# Patient Record
Sex: FEMALE | Race: WHITE | NOT HISPANIC OR LATINO | Employment: OTHER | URBAN - METROPOLITAN AREA
[De-identification: names, ages, dates, MRNs, and addresses within clinical notes are randomized per-mention and may not be internally consistent; named-entity substitution may affect disease eponyms.]

---

## 2023-10-08 ENCOUNTER — APPOINTMENT (EMERGENCY)
Dept: CT IMAGING | Facility: HOSPITAL | Age: 73
End: 2023-10-08

## 2023-10-08 ENCOUNTER — HOSPITAL ENCOUNTER (EMERGENCY)
Facility: HOSPITAL | Age: 73
Discharge: HOME | End: 2023-10-08
Attending: EMERGENCY MEDICINE | Admitting: EMERGENCY MEDICINE

## 2023-10-08 VITALS
RESPIRATION RATE: 15 BRPM | SYSTOLIC BLOOD PRESSURE: 137 MMHG | WEIGHT: 149.91 LBS | OXYGEN SATURATION: 97 % | TEMPERATURE: 98.2 F | HEART RATE: 69 BPM | DIASTOLIC BLOOD PRESSURE: 65 MMHG

## 2023-10-08 DIAGNOSIS — R51.9 HEADACHE: Primary | ICD-10-CM

## 2023-10-08 DIAGNOSIS — R11.2 NAUSEA AND VOMITING: ICD-10-CM

## 2023-10-08 DIAGNOSIS — Z75.8 DOES NOT HAVE PRIMARY CARE PROVIDER: ICD-10-CM

## 2023-10-08 DIAGNOSIS — M54.2 NECK PAIN: ICD-10-CM

## 2023-10-08 LAB
ALBUMIN SERPL BCP-MCNC: 4.3 G/DL (ref 3.5–5)
ALP SERPL-CCNC: 81 U/L (ref 34–104)
ALT SERPL W P-5'-P-CCNC: 10 U/L (ref 7–52)
ANION GAP SERPL CALCULATED.3IONS-SCNC: 9 MMOL/L
AST SERPL W P-5'-P-CCNC: 13 U/L (ref 13–39)
BASOPHILS # BLD AUTO: 0.03 THOUSANDS/ÂΜL (ref 0–0.1)
BASOPHILS NFR BLD AUTO: 1 % (ref 0–1)
BILIRUB SERPL-MCNC: 0.79 MG/DL (ref 0.2–1)
BUN SERPL-MCNC: 14 MG/DL (ref 5–25)
CALCIUM SERPL-MCNC: 9.1 MG/DL (ref 8.4–10.2)
CHLORIDE SERPL-SCNC: 105 MMOL/L (ref 96–108)
CO2 SERPL-SCNC: 26 MMOL/L (ref 21–32)
CREAT SERPL-MCNC: 0.53 MG/DL (ref 0.6–1.3)
EOSINOPHIL # BLD AUTO: 0.06 THOUSAND/ÂΜL (ref 0–0.61)
EOSINOPHIL NFR BLD AUTO: 1 % (ref 0–6)
ERYTHROCYTE [DISTWIDTH] IN BLOOD BY AUTOMATED COUNT: 13.1 % (ref 11.6–15.1)
GFR SERPL CREATININE-BSD FRML MDRD: 95 ML/MIN/1.73SQ M
GLUCOSE SERPL-MCNC: 102 MG/DL (ref 65–140)
HCT VFR BLD AUTO: 39.3 % (ref 34.8–46.1)
HGB BLD-MCNC: 13.6 G/DL (ref 11.5–15.4)
IMM GRANULOCYTES # BLD AUTO: 0.01 THOUSAND/UL (ref 0–0.2)
IMM GRANULOCYTES NFR BLD AUTO: 0 % (ref 0–2)
LYMPHOCYTES # BLD AUTO: 2.62 THOUSANDS/ÂΜL (ref 0.6–4.47)
LYMPHOCYTES NFR BLD AUTO: 42 % (ref 14–44)
MCH RBC QN AUTO: 30.3 PG (ref 26.8–34.3)
MCHC RBC AUTO-ENTMCNC: 34.6 G/DL (ref 31.4–37.4)
MCV RBC AUTO: 88 FL (ref 82–98)
MONOCYTES # BLD AUTO: 0.45 THOUSAND/ÂΜL (ref 0.17–1.22)
MONOCYTES NFR BLD AUTO: 7 % (ref 4–12)
NEUTROPHILS # BLD AUTO: 3.05 THOUSANDS/ÂΜL (ref 1.85–7.62)
NEUTS SEG NFR BLD AUTO: 49 % (ref 43–75)
NRBC BLD AUTO-RTO: 0 /100 WBCS
PLATELET # BLD AUTO: 297 THOUSANDS/UL (ref 149–390)
PMV BLD AUTO: 8.9 FL (ref 8.9–12.7)
POTASSIUM SERPL-SCNC: 3.6 MMOL/L (ref 3.5–5.3)
PROT SERPL-MCNC: 7.2 G/DL (ref 6.4–8.4)
RBC # BLD AUTO: 4.49 MILLION/UL (ref 3.81–5.12)
SODIUM SERPL-SCNC: 140 MMOL/L (ref 135–147)
WBC # BLD AUTO: 6.22 THOUSAND/UL (ref 4.31–10.16)

## 2023-10-08 PROCEDURE — 96374 THER/PROPH/DIAG INJ IV PUSH: CPT

## 2023-10-08 PROCEDURE — 70496 CT ANGIOGRAPHY HEAD: CPT

## 2023-10-08 PROCEDURE — 99283 EMERGENCY DEPT VISIT LOW MDM: CPT

## 2023-10-08 PROCEDURE — G1004 CDSM NDSC: HCPCS

## 2023-10-08 PROCEDURE — 99284 EMERGENCY DEPT VISIT MOD MDM: CPT | Performed by: EMERGENCY MEDICINE

## 2023-10-08 PROCEDURE — 70498 CT ANGIOGRAPHY NECK: CPT

## 2023-10-08 PROCEDURE — 85025 COMPLETE CBC W/AUTO DIFF WBC: CPT | Performed by: EMERGENCY MEDICINE

## 2023-10-08 PROCEDURE — 80053 COMPREHEN METABOLIC PANEL: CPT | Performed by: EMERGENCY MEDICINE

## 2023-10-08 PROCEDURE — 36415 COLL VENOUS BLD VENIPUNCTURE: CPT | Performed by: EMERGENCY MEDICINE

## 2023-10-08 RX ORDER — METOCLOPRAMIDE HYDROCHLORIDE 5 MG/ML
10 INJECTION INTRAMUSCULAR; INTRAVENOUS ONCE
Status: COMPLETED | OUTPATIENT
Start: 2023-10-08 | End: 2023-10-08

## 2023-10-08 RX ORDER — DIAZEPAM 5 MG/1
5 TABLET ORAL ONCE
Status: COMPLETED | OUTPATIENT
Start: 2023-10-08 | End: 2023-10-08

## 2023-10-08 RX ORDER — ACETAMINOPHEN 325 MG/1
975 TABLET ORAL ONCE
Status: COMPLETED | OUTPATIENT
Start: 2023-10-08 | End: 2023-10-08

## 2023-10-08 RX ORDER — METOCLOPRAMIDE 10 MG/1
10 TABLET ORAL EVERY 6 HOURS
Qty: 30 TABLET | Refills: 0 | Status: SHIPPED | OUTPATIENT
Start: 2023-10-08 | End: 2023-10-16 | Stop reason: ALTCHOICE

## 2023-10-08 RX ADMIN — METOCLOPRAMIDE 10 MG: 5 INJECTION, SOLUTION INTRAMUSCULAR; INTRAVENOUS at 19:56

## 2023-10-08 RX ADMIN — IOHEXOL 100 ML: 350 INJECTION, SOLUTION INTRAVENOUS at 21:14

## 2023-10-08 RX ADMIN — DIAZEPAM 5 MG: 5 TABLET ORAL at 19:46

## 2023-10-08 RX ADMIN — ACETAMINOPHEN 975 MG: 325 TABLET, FILM COATED ORAL at 19:46

## 2023-10-08 NOTE — ED PROVIDER NOTES
History  Chief Complaint   Patient presents with   • Headache     Pt c/o headache and pain in her neck for a about  week. Pt indicated that she has been vomiting from the pain. PT stated that her PCP told her it was from arthritis in her neck which is causing numbness in her arms. 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 The patient is primarily Japanese speaking, language interpretor service utilized for encounter (interpretor #[id number]). The patient states that over the last week she has been experiencing bilateral posterior neck pain with resulting posterior and generalized headache as well as episodes of nausea and vomiting secondary to pain. She notes that she has had similar episodes of neck pain, headache, and N/V off and on for many years which has been attributed to cervical arthritis. The patient is originally from [facility] and moved to the area several months ago and she is in the process of establishing care with local primary care and specialists. She notes occasional tingling in her bilateral arms when she experiences the neck pain and headache. She denies any fever, chills, cough, dyspnea, sore throat, chest pain, abdominal pain, diarrhea, urinary symptoms, back pain, visual changes, dizziness, syncope, numbness, or focal weakness. None       Past Medical History:   Diagnosis Date   • Disease of thyroid gland        Past Surgical History:   Procedure Laterality Date   • THYROIDECTOMY         No family history on file. I have reviewed and agree with the history as documented. E-Cigarette/Vaping   • E-Cigarette Use Never User      E-Cigarette/Vaping Substances     Social History     Tobacco Use   • Smoking status: Never   Vaping Use   • Vaping Use: Never used   Substance Use Topics   • Alcohol use: Never   • Drug use: Never       Review of Systems   Constitutional: Negative for chills and fever. HENT: Negative for congestion, rhinorrhea and sore throat. Respiratory: Negative for cough and shortness of breath. Cardiovascular: Negative for chest pain and palpitations. Gastrointestinal: Positive for nausea and vomiting. Negative for abdominal pain and diarrhea. Genitourinary: Negative for dysuria and hematuria. Musculoskeletal: Positive for neck pain. Negative for back pain. Neurological: Positive for headaches. Negative for dizziness, weakness, light-headedness and numbness. All other systems reviewed and are negative. Physical Exam  Physical Exam  Vitals and nursing note reviewed. Constitutional:       General: She is not in acute distress. Appearance: Normal appearance. She is normal weight. She is not ill-appearing. HENT:      Head: Normocephalic and atraumatic. Right Ear: External ear normal.      Left Ear: External ear normal.      Nose: Nose normal. No congestion or rhinorrhea. Mouth/Throat:      Mouth: Mucous membranes are moist.      Pharynx: Oropharynx is clear. No oropharyngeal exudate or posterior oropharyngeal erythema. Eyes:      Extraocular Movements: Extraocular movements intact. Conjunctiva/sclera: Conjunctivae normal.      Pupils: Pupils are equal, round, and reactive to light. Neck:      Comments: Patient reports bilateral upper cervical muscle tightness and pain with no focal tenderness on palpation, no midline tenderness or step-offs, normal range of motion. Cardiovascular:      Rate and Rhythm: Normal rate and regular rhythm. Pulses: Normal pulses. Heart sounds: Normal heart sounds. No murmur heard. Pulmonary:      Effort: Pulmonary effort is normal. No respiratory distress. Breath sounds: Normal breath sounds. No wheezing or rales. Abdominal:      General: Abdomen is flat. Bowel sounds are normal. There is no distension. Palpations: Abdomen is soft. Tenderness: There is no abdominal tenderness.  There is no right CVA tenderness, left CVA tenderness or guarding. Musculoskeletal:         General: No swelling or tenderness. Normal range of motion. Cervical back: Normal range of motion and neck supple. No tenderness. Skin:     General: Skin is warm and dry. Capillary Refill: Capillary refill takes less than 2 seconds. Neurological:      General: No focal deficit present. Mental Status: She is alert and oriented to person, place, and time. Cranial Nerves: No cranial nerve deficit. Sensory: No sensory deficit. Motor: No weakness. Comments: Awake, alert, oriented x3. No facial asymmetry. Radial nerves II-XII intact and symmetric. Strength and sensation grossly intact and symmetric in the bilateral upper and lower extremities. No focal deficits.          Vital Signs  ED Triage Vitals   Temperature Pulse Respirations Blood Pressure SpO2   10/08/23 1851 10/08/23 1851 10/08/23 1851 10/08/23 1851 10/08/23 1851   98.2 °F (36.8 °C) 92 20 (!) 192/91 98 %      Temp Source Heart Rate Source Patient Position - Orthostatic VS BP Location FiO2 (%)   10/08/23 1851 10/08/23 1851 10/08/23 1851 10/08/23 1851 --   Oral Monitor Sitting Left arm       Pain Score       10/08/23 1930       6           Vitals:    10/08/23 1851 10/08/23 2115   BP: (!) 192/91 159/72   Pulse: 92 76   Patient Position - Orthostatic VS: Sitting Lying         Visual Acuity      ED Medications  Medications   metoclopramide (REGLAN) injection 10 mg (10 mg Intravenous Given 10/8/23 1956)   acetaminophen (TYLENOL) tablet 975 mg (975 mg Oral Given 10/8/23 1946)   diazepam (VALIUM) tablet 5 mg (5 mg Oral Given 10/8/23 1946)   iohexol (OMNIPAQUE) 350 MG/ML injection (SINGLE-DOSE) 100 mL (100 mL Intravenous Given 10/8/23 2114)       Diagnostic Studies  Results Reviewed     Procedure Component Value Units Date/Time    Comprehensive metabolic panel [[id number]]  (Abnormal) Collected: 10/08/23 2000    Lab Status: Final result Specimen: Blood from Arm, Left Updated: 10/08/23 2021     Sodium 140 mmol/L      Potassium 3.6 mmol/L      Chloride 105 mmol/L      CO2 26 mmol/L      ANION GAP 9 mmol/L      BUN 14 mg/dL      Creatinine 0.53 mg/dL      Glucose 102 mg/dL      Calcium 9.1 mg/dL      AST 13 U/L      ALT 10 U/L      Alkaline Phosphatase 81 U/L      Total Protein 7.2 g/dL      Albumin 4.3 g/dL      Total Bilirubin 0.79 mg/dL      eGFR 95 ml/min/1.73sq m     Narrative:      National Kidney Disease Foundation guidelines for Chronic Kidney Disease (CKD):   •  Stage 1 with normal or high GFR (GFR > 90 mL/min/1.73 square meters)  •  Stage 2 Mild CKD (GFR = 60-89 mL/min/1.73 square meters)  •  Stage 3A Moderate CKD (GFR = 45-59 mL/min/1.73 square meters)  •  Stage 3B Moderate CKD (GFR = 30-44 mL/min/1.73 square meters)  •  Stage 4 Severe CKD (GFR = 15-29 mL/min/1.73 square meters)  •  Stage 5 End Stage CKD (GFR <15 mL/min/1.73 square meters)  Note: GFR calculation is accurate only with a steady state creatinine    CBC and differential [[id number]] Collected: 10/08/23 2000    Lab Status: Final result Specimen: Blood from Arm, Left Updated: 10/08/23 2007     WBC 6.22 Thousand/uL      RBC 4.49 Million/uL      Hemoglobin 13.6 g/dL      Hematocrit 39.3 %      MCV 88 fL      MCH 30.3 pg      MCHC 34.6 g/dL      RDW 13.1 %      MPV 8.9 fL      Platelets 352 Thousands/uL      nRBC 0 /100 WBCs      Neutrophils Relative 49 %      Immat GRANS % 0 %      Lymphocytes Relative 42 %      Monocytes Relative 7 %      Eosinophils Relative 1 %      Basophils Relative 1 %      Neutrophils Absolute 3.05 Thousands/µL      Immature Grans Absolute 0.01 Thousand/uL      Lymphocytes Absolute 2.62 Thousands/µL      Monocytes Absolute 0.45 Thousand/µL      Eosinophils Absolute 0.06 Thousand/µL      Basophils Absolute 0.03 Thousands/µL                  CTA head and neck with and without contrast   Final Result by Yuniel Rivera DO (10/08 2142)      No acute intracranial abnormality No large vessel occlusion, aneurysm, or dissection            Workstation performed: [id number]                    Procedures  Procedures         ED Course  ED Course as of 10/08/23 2221   Sun Oct 08, 2023   2026 WBC: 6.22   2026 Hemoglobin: 13.6   2026 Platelet Count: 268   2026 Sodium: 140   2026 Potassium: 3.6   2026 Chloride: 105   2026 CO2: 26   2026 Anion Gap: 9   2026 BUN: 14   2026 Creatinine(!): 0.53   2026 Glucose, Random: 102   2026 eGFR: 95   2026 Calcium: 9.1   2026 AST: 13   2026 ALT: 10   2026 Alkaline Phosphatase: 81   2150 CTA head and neck with and without contrast  No acute intracranial abnormality     No large vessel occlusion, aneurysm, or dissection                                             Medical Decision Making  71 y/o female with 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 The patient is primarily Faroese speaking, language interpretor service utilized for encounter (interpretor #[id number]). The patient states that over the last week she has been experiencing bilateral posterior neck pain with resulting posterior and generalized headache as well as episodes of nausea and vomiting secondary to pain. She notes that she has had similar episodes of neck pain, headache, and N/V off and on for many years which has been attributed to cervical arthritis. The patient is originally from [facility] and moved to the area several months ago and she is in the process of establishing care with local primary care and specialists. She notes occasional tingling in her bilateral arms when she experiences the neck pain and headache. She denies any fever, chills, cough, dyspnea, sore throat, chest pain, abdominal pain, diarrhea, urinary symptoms, back pain, visual changes, dizziness, syncope, numbness, or focal weakness. Vital signs reviewed.  Patient reports bilateral upper cervical muscle tightness and pain with no focal tenderness on palpation, no midline tenderness or step-offs, normal range of motion. Awake, alert, oriented x3. No facial asymmetry. Radial nerves II-XII intact and symmetric. Strength and sensation grossly intact and symmetric in the bilateral upper and lower extremities. No focal deficits. See physical exam documentation for full exam findings. Differential diagnosis includes but is not limited to migraine, cervical muscle spasm, cervicogenic headache, aneurysm, ICH, and/or cervical arthritis. Will obtain CBC, chemistry, and CTA head and neck with and without contrast.  Symptomatic treatment with Reglan, Tylenol, and Valium. Imaging with no acute intracranial abnormality. Symptoms improved after treatment. Provided patient with referral for primary care as well as comprehensive spine. I discussed all findings, treatment, red flags/return precautions, and outpatient follow-up and the patient/family understand and agree. Stable for discharge. Amount and/or Complexity of Data Reviewed  Labs: ordered. Decision-making details documented in ED Course. Radiology: ordered. Decision-making details documented in ED Course. Risk  OTC drugs. Prescription drug management. Disposition  Final diagnoses:   Headache   Neck pain   Nausea and vomiting   Does not have primary care provider     Time reflects when diagnosis was documented in both MDM as applicable and the Disposition within this note     Time User Action Codes Description Comment    10/8/2023 10:04 PM Johnna Cannon Add [R51.9] Headache     10/8/2023 10:04 PM Johnna Cannon Add [M54.2] Neck pain     10/8/2023 10:05 PM Johnna Cannon Add [R11.2] Nausea and vomiting     10/8/2023 10:06 PM Johnna Cannon Add [Z75.8] Does not have primary care provider       ED Disposition     ED Disposition   Discharge    Condition   Stable    Date/Time   Sun Oct 8, 2023 10:05 PM    Manjit discharge to home/self care.                Follow-up Information Follow up With Specialties Details Why Contact Info Additional [address] [facility] [place] [facility] Call  As needed primary care needs to establish care [address] [facility] [phone]  [address] [facility] [place], [address]  GREGORY [address], [facility] [place], [place], [address] [facility] Department Emergency Medicine Go to  If symptoms worsen [address] [place] [address] [id number] [address] [facility] Emergency Department, [address] [place] [address] Dr, [address] [facility] Physical Therapy Call  For neck and spine specialist follow-up [phone]          Patient's Medications   Discharge Prescriptions    METOCLOPRAMIDE (REGLAN) 10 MG TABLET    Take 1 tablet (10 mg total) by mouth every 6 (six) hours       Start Date: 10/8/2023 End Date: --       Order Dose: 10 mg       Quantity: 30 tablet    Refills: 0           PDMP Review     None          ED Provider  Electronically Signed by           John Mclaughlin MD  10/08/23 7764

## 2023-10-09 ENCOUNTER — TELEPHONE (OUTPATIENT)
Dept: PHYSICAL THERAPY | Facility: OTHER | Age: 73
End: 2023-10-09

## 2023-10-09 NOTE — TELEPHONE ENCOUNTER
Patient's son in law called into [facility] today . I explained [facility] and made him aware I need to speak with the patient directly or if she gives the ok he  Or her daughter could be on speaker to help with translation. (Patient speaks Greek). NO INSURANCE, explained there is a self-pay option but PT site discuss prices with the patient. They are not sure and will discuss with the patient further and will call back. Will close referral per protocol. And will triage when patient calls back.

## 2023-10-16 ENCOUNTER — OFFICE VISIT (OUTPATIENT)
Dept: INTERNAL MEDICINE CLINIC | Facility: CLINIC | Age: 73
End: 2023-10-16

## 2023-10-16 VITALS
OXYGEN SATURATION: 95 % | SYSTOLIC BLOOD PRESSURE: 154 MMHG | TEMPERATURE: 99.4 F | DIASTOLIC BLOOD PRESSURE: 100 MMHG | BODY MASS INDEX: 26.8 KG/M2 | HEIGHT: 64 IN | HEART RATE: 96 BPM | RESPIRATION RATE: 16 BRPM | WEIGHT: 157 LBS

## 2023-10-16 DIAGNOSIS — R03.0 ELEVATED BLOOD PRESSURE READING: ICD-10-CM

## 2023-10-16 DIAGNOSIS — R51.9 HEADACHE: ICD-10-CM

## 2023-10-16 DIAGNOSIS — M54.2 NECK PAIN: Primary | ICD-10-CM

## 2023-10-16 RX ORDER — MELOXICAM 7.5 MG/1
7.5 TABLET ORAL DAILY
Qty: 10 TABLET | Refills: 0 | Status: SHIPPED | OUTPATIENT
Start: 2023-10-16

## 2023-10-16 RX ORDER — ACETAMINOPHEN 325 MG/1
650 TABLET ORAL EVERY 6 HOURS PRN
COMMUNITY

## 2023-10-16 NOTE — ASSESSMENT & PLAN NOTE
Localized to the occipital area, occurs along with the neck pain. No photophobia or visual disturbances. As above for neck pain.

## 2023-10-16 NOTE — ASSESSMENT & PLAN NOTE
-Patient blood pressure in the clinic is elevated at 154/100.  -Repeat manual blood pressure was 142/82  -Patient states her blood pressure is generally low to normal. -But family denies having any blood pressure machine at home. Patient advised to purchase blood pressure machine and monitor her blood pressure daily for a week and call the office with the readings. Patient and family member are in agreement with the plan.

## 2023-10-16 NOTE — PROGRESS NOTES
Name: Shira Bates      : 1950      MRN: [id number]  Encounter Provider: César Mosqueda MD  Encounter Date: 10/16/2023   Encounter department: [address] [place] [address]     1. Neck pain  Assessment & Plan:  -Evaluation of neck pain that has been ongoing for 7 years.  -Denies any injury to the neck or accidents in the past.  -Pain is aggravated by moving the head to the side and back. Relieved with Tylenol.  -Pain is localized in the neck posterior occipital area of the head, with intermittent numbness of the neck and left shoulder.  -Recently evaluated in the ED with CTA head and neck negative for any vascular lesions.  -Arthritis of the cervical spine, cervical stenosis or stiffness of the neck muscles are possible differentials. Plan:  X-ray of the cervical spine  Mobic daily for 10 days  Voltaren gel to be applied over the neck every 4 hours as needed for pain    Orders:  -     Ambulatory Referral to Internal Medicine  -     XR spine cervical complete 4 or 5 vw non injury; Future; Expected date: 10/16/2023  -     Diclofenac Sodium (VOLTAREN) 1 %; Apply 2 g topically 4 (four) times a day  -     meloxicam (Mobic) 7.5 mg tablet; Take 1 tablet (7.5 mg total) by mouth daily    2. Headache  Assessment & Plan:  Localized to the occipital area, occurs along with the neck pain. No photophobia or visual disturbances. As above for neck pain. Orders:  -     Ambulatory Referral to Internal Medicine  -     meloxicam (Mobic) 7.5 mg tablet; Take 1 tablet (7.5 mg total) by mouth daily    3. Elevated blood pressure reading  Assessment & Plan:  -Patient blood pressure in the clinic is elevated at 154/100.  -Repeat manual blood pressure was 142/82  -Patient states her blood pressure is generally low to normal. -But family denies having any blood pressure machine at home.     Patient advised to purchase blood pressure machine and monitor her blood pressure daily for a week and call the office with the readings. Patient and family member are in agreement with the plan. Depression Screening and Follow-up Plan: Patient was screened for depression during today's encounter. They screened negative with a PHQ-2 score of 0. Subjective      Ms. Nita Grande is a 70-year-old female primarily Mongolian speaking with no notable past medical history other than neck pain with associated head ache for the past 7 years. Patient is any previous trauma or injury to the head or neck. She recently immigrated from [facility] in July, does endorse getting evaluated in [facility] where she was told her pain is likely associated with rheumatoid arthritis and was advised to take Tylenol as needed. Her neck pain is aggravated by movement of the head to the side and back and is improved with Tylenol which she is taking 2-3 times a day for. She presents to the clinic for further evaluation as she has been needing to take Tylenol regularly to find relief of her pain. She endorses 1 or 2 episodes of associated nausea and vomiting. She was recently evaluated in the ED and work-up with CTA head and neck was negative. Patient denies any other symptoms at this time. History was obtained with the help of  services through [employer]. Review of Systems   Constitutional:  Negative for activity change, appetite change, chills and fever. HENT:  Negative for hearing loss. Respiratory:  Negative for cough, chest tightness and shortness of breath. Cardiovascular:  Negative for chest pain. Gastrointestinal:  Negative for abdominal pain, constipation and diarrhea. Genitourinary:  Negative for dysuria and urgency. Musculoskeletal:  Positive for neck pain. Negative for arthralgias and myalgias. Neurological:  Positive for headaches. Negative for dizziness and weakness.        Current Outpatient Medications on File Prior to Visit   Medication Sig    acetaminophen (TYLENOL) 325 mg tablet Take 650 mg by mouth every 6 (six) hours as needed for mild pain    [DISCONTINUED] metoclopramide (Reglan) 10 mg tablet Take 1 tablet (10 mg total) by mouth every 6 (six) hours (Patient not taking: Reported on 10/16/2023)       Objective     /100 (BP Location: Left arm, Patient Position: Sitting, Cuff Size: Adult)   Pulse 96   Temp 99.4 °F (37.4 °C) (Tympanic)   Resp 16   Ht 5' 4" (1.626 m)   Wt 71.2 kg (157 lb)   SpO2 95%   BMI 26.95 kg/m²     Physical Exam  Vitals reviewed. Constitutional:       General: She is not in acute distress. Appearance: Normal appearance. She is not ill-appearing. HENT:      Head: Normocephalic and atraumatic. Nose: Nose normal.      Mouth/Throat:      Mouth: Mucous membranes are moist.      Pharynx: Oropharynx is clear. Eyes:      Conjunctiva/sclera: Conjunctivae normal.   Cardiovascular:      Rate and Rhythm: Normal rate and regular rhythm. Pulmonary:      Effort: Pulmonary effort is normal. No respiratory distress. Breath sounds: No stridor. Abdominal:      General: Bowel sounds are normal. There is no distension. Palpations: Abdomen is soft. There is no mass. Tenderness: There is no abdominal tenderness. Musculoskeletal:      Cervical back: Tenderness (Superior part of the neck, and base of the skull on palpation) present. No rigidity. Right lower leg: No edema. Left lower leg: No edema. Skin:     General: Skin is warm. Coloration: Skin is not jaundiced. Neurological:      Mental Status: She is alert. Mental status is at baseline.        Percy Alexandra MD

## 2023-10-16 NOTE — ASSESSMENT & PLAN NOTE
-Evaluation of neck pain that has been ongoing for 7 years.  -Denies any injury to the neck or accidents in the past.  -Pain is aggravated by moving the head to the side and back. Relieved with Tylenol.  -Pain is localized in the neck posterior occipital area of the head, with intermittent numbness of the neck and left shoulder.  -Recently evaluated in the ED with CTA head and neck negative for any vascular lesions.  -Arthritis of the cervical spine, cervical stenosis or stiffness of the neck muscles are possible differentials.     Plan:  X-ray of the cervical spine  Mobic daily for 10 days  Voltaren gel to be applied over the neck every 4 hours as needed for pain

## 2023-10-16 NOTE — PATIENT INSTRUCTIONS
Check your blood pressure daily for one week and call our office with the readings. Apply Voltaren Gell to neck 4 times a day.

## 2023-10-20 ENCOUNTER — HOSPITAL ENCOUNTER (OUTPATIENT)
Dept: RADIOLOGY | Facility: HOSPITAL | Age: 73
End: 2023-10-20

## 2023-10-20 DIAGNOSIS — M54.2 NECK PAIN: ICD-10-CM

## 2023-10-20 PROCEDURE — 72050 X-RAY EXAM NECK SPINE 4/5VWS: CPT

## 2023-10-27 ENCOUNTER — OFFICE VISIT (OUTPATIENT)
Dept: URGENT CARE | Facility: MEDICAL CENTER | Age: 73
End: 2023-10-27

## 2023-10-27 VITALS
OXYGEN SATURATION: 96 % | DIASTOLIC BLOOD PRESSURE: 71 MMHG | WEIGHT: 160 LBS | SYSTOLIC BLOOD PRESSURE: 134 MMHG | HEART RATE: 94 BPM | TEMPERATURE: 97.4 F | RESPIRATION RATE: 18 BRPM | HEIGHT: 64 IN | BODY MASS INDEX: 27.31 KG/M2

## 2023-10-27 DIAGNOSIS — Z71.1 PHYSICALLY WELL BUT WORRIED: Primary | ICD-10-CM

## 2023-10-27 PROCEDURE — 99213 OFFICE O/P EST LOW 20 MIN: CPT

## 2023-10-27 NOTE — PATIENT INSTRUCTIONS
[facility] Dr. Ayad Everett or Dr. David Fields, your PCP who you were seen by on 10/16/2023. As previously instructed, monitor your blood pressure daily and call their office with the blood pressure readings. XR cervical spine has not been read by radiology yet. Please download [id number] to look for results or contact your PCP. Follow up with PCP in 3-5 days. If you develop any new or concerning symptoms such as the worst headache of your life, sudden severe headache, lightheaded or dizziness, visual disturbance, please proceed to the ER.

## 2023-10-27 NOTE — PROGRESS NOTES
[facility]        NAME: Leda Dong is a 72 y.o. female  : 1950    MRN: [id number]  DATE: 2023  TIME: 2:01 PM    Assessment and Plan   Physically well but worried [Z71.1]  1. Physically well but worried          Advised pt and family, that pt should not take medication that is not prescribed to her. Especially medication that potentially could affect her blood pressure that is being unmonitored. Pt's son verbalized understanding. Patient Instructions     Please call Jose Foley, Dr. Storm Welch or Dr. Aryan Santana, your PCP who you were seen by on 10/16/2023. As previously instructed, monitor your blood pressure daily and call their office with the blood pressure readings. XR cervical spine has not been read by radiology yet. Please download [id number] to look for results or contact your PCP. Follow up with PCP in 3-5 days. If you develop any new or concerning symptoms such as the worst headache of your life, sudden severe headache, lightheaded or dizziness, visual disturbance, please proceed to the ER. Chief Complaint     Chief Complaint   Patient presents with   • Blood Pressure Check     Pt. Reports that her BP has been intermittently high at home. She states that it has not been "that high". History of Present Illness       Pt is a 71 y/o F who presents to the clinic with a CC of ongoing but improving head and neck pain. [employer]  Services utilized for this visit. Family states they brought the pt to the clinic for medication for her blood pressure. Pt's son reports that she was seen in the ER and by internal medicine for her blood pressure. Upon chart review, family was instructed by her PCP that they should obtain an at home blood pressure cuff and check her blood pressure daily and call with the readings. Pt's son states they have not been taking her BP and have not call her PCP.  Pt son's stated that she took 2 blood pressure pills, stated the medication is called "Valstran". Medication is not prescribed to patient. Pt's son also states he would like to know what her XR results are for her neck which is the other reason he brought the patient to the clinic. Review of Systems   Review of Systems   Constitutional:  Negative for chills, diaphoresis and fever. Respiratory: Negative. Negative for cough, shortness of breath and wheezing. Cardiovascular: Negative. Negative for chest pain and palpitations. Skin: Negative. Negative for color change. Current Medications       Current Outpatient Medications:   •  acetaminophen (TYLENOL) 325 mg tablet, Take 650 mg by mouth every 6 (six) hours as needed for mild pain, Disp: , Rfl:   •  Diclofenac Sodium (VOLTAREN) 1 %, Apply 2 g topically 4 (four) times a day, Disp: 2 g, Rfl: 0  •  meloxicam (Mobic) 7.5 mg tablet, Take 1 tablet (7.5 mg total) by mouth daily, Disp: 10 tablet, Rfl: 0    Current Allergies     Allergies as of 10/27/2023   • (No Known Allergies)            The following portions of the patient's history were reviewed and updated as appropriate: allergies, current medications, past family history, past medical history, past social history, past surgical history and problem list.     Past Medical History:   Diagnosis Date   • Disease of thyroid gland        Past Surgical History:   Procedure Laterality Date   • THYROIDECTOMY         No family history on file. Medications have been verified. Objective   /71   Pulse 94   Temp (!) 97.4 °F (36.3 °C)   Resp 18   Ht 5' 4" (1.626 m)   Wt 72.6 kg (160 lb)   SpO2 96%   BMI 27.46 kg/m²        Physical Exam     Physical Exam  Vitals and nursing note reviewed. Constitutional:       General: She is not in acute distress. Appearance: Normal appearance. She is not ill-appearing, toxic-appearing or diaphoretic. HENT:      Head: Normocephalic.    Cardiovascular:      Rate and Rhythm: Normal rate and regular rhythm. Pulses: Normal pulses. Heart sounds: Normal heart sounds. No murmur heard. Pulmonary:      Effort: Pulmonary effort is normal. No respiratory distress. Breath sounds: Normal breath sounds. No stridor. No wheezing, rhonchi or rales. Chest:      Chest wall: No tenderness. Musculoskeletal:         General: Normal range of motion. Skin:     General: Skin is warm. Neurological:      Mental Status: She is alert.

## 2023-11-09 ENCOUNTER — OFFICE VISIT (OUTPATIENT)
Dept: INTERNAL MEDICINE CLINIC | Facility: CLINIC | Age: 73
End: 2023-11-09

## 2023-11-09 VITALS
HEART RATE: 84 BPM | WEIGHT: 163 LBS | DIASTOLIC BLOOD PRESSURE: 88 MMHG | SYSTOLIC BLOOD PRESSURE: 150 MMHG | TEMPERATURE: 99.1 F | OXYGEN SATURATION: 98 % | BODY MASS INDEX: 27.98 KG/M2 | RESPIRATION RATE: 16 BRPM

## 2023-11-09 DIAGNOSIS — M54.2 NECK PAIN: Primary | ICD-10-CM

## 2023-11-09 DIAGNOSIS — M47.812 SPONDYLOSIS OF CERVICAL REGION WITHOUT MYELOPATHY OR RADICULOPATHY: ICD-10-CM

## 2023-11-09 DIAGNOSIS — I10 HYPERTENSION, UNSPECIFIED TYPE: ICD-10-CM

## 2023-11-09 PROBLEM — R03.0 ELEVATED BLOOD PRESSURE READING: Status: RESOLVED | Noted: 2023-10-16 | Resolved: 2023-11-09

## 2023-11-09 PROCEDURE — 99213 OFFICE O/P EST LOW 20 MIN: CPT

## 2023-11-09 RX ORDER — SENNOSIDES 8.6 MG
650 CAPSULE ORAL EVERY 8 HOURS PRN
Qty: 30 TABLET | Refills: 0 | Status: SHIPPED | OUTPATIENT
Start: 2023-11-09

## 2023-11-09 RX ORDER — AMLODIPINE BESYLATE 5 MG/1
5 TABLET ORAL DAILY
Qty: 90 TABLET | Refills: 0 | Status: SHIPPED | OUTPATIENT
Start: 2023-11-09

## 2023-11-09 RX ORDER — CAPSAICIN 0.025 %
1 CREAM (GRAM) TOPICAL 2 TIMES DAILY
Qty: 25 G | Refills: 0 | Status: SHIPPED | OUTPATIENT
Start: 2023-11-09

## 2023-11-09 NOTE — ASSESSMENT & PLAN NOTE
Follow up for neck pain and associated occipital headache, found relief with the use of Mobic and Voltaren gel. X-ray of the spine shows some osteophytes posteriorly and mild arthritic changes. As patient's continues to have elevated blood pressure the pain reigmin was changed to Capsaicin cream BID and prn Tylenol arthritis 650 mg Q 8 hours. In addition patient is advised to use warm compresses and wear a scarf around the neck to keep warm when outdoors. Physical therapy referral was offered but patient's relative states there is already one in place and will like to focus on symptomatic treatment at this time.

## 2023-11-09 NOTE — ASSESSMENT & PLAN NOTE
Patient's BP remains elevated in systolic 970'B. Patient has not been checking her BP at home, she denies any chest pain, pressure, headaches other than those associated with her neck pain. Will start Amlodipine 5 mg due to repeat high blood pressure readings. Patient is advised to monitor and report for any signs of hypotension, lightheadedness or weakness. She is encouraged to check her blood pressure at home. She is agreeable to come in for a nurse visit for BP check in the clinic followed by a 3 month follow up.

## 2023-11-09 NOTE — PROGRESS NOTES
Name: Annel Jim      : 1950      MRN: [id number]  Encounter Provider: Juancarlos Redding MD  Encounter Date: 2023   Encounter department: [address] [place] [address]     1. Neck pain  Assessment & Plan:  Follow up for neck pain and associated occipital headache, found relief with the use of Mobic and Voltaren gel. X-ray of the spine shows some osteophytes posteriorly and mild arthritic changes. As patient's continues to have elevated blood pressure the pain reigmin was changed to Capsaicin cream BID and prn Tylenol arthritis 650 mg Q 8 hours. In addition patient is advised to use warm compresses and wear a scarf around the neck to keep warm when outdoors. Physical therapy referral was offered but patient's relative states there is already one in place and will like to focus on symptomatic treatment at this time. Orders:  -     acetaminophen (TYLENOL) 650 mg CR tablet; Take 1 tablet (650 mg total) by mouth every 8 (eight) hours as needed for mild pain  -     capsaicin (ZOSTRIX) 0.025 % cream; Apply 1 Application topically 2 (two) times a day    2. Hypertension, unspecified type  Assessment & Plan:  Patient's BP remains elevated in systolic 713'Y. Patient has not been checking her BP at home, she denies any chest pain, pressure, headaches other than those associated with her neck pain. Will start Amlodipine 5 mg due to repeat high blood pressure readings. Patient is advised to monitor and report for any signs of hypotension, lightheadedness or weakness. She is encouraged to check her blood pressure at home. She is agreeable to come in for a nurse visit for BP check in the clinic followed by a 3 month follow up. Orders:  -     amLODIPine (NORVASC) 5 mg tablet; Take 1 tablet (5 mg total) by mouth daily    3.  Spondylosis of cervical region without myelopathy or radiculopathy  Assessment & Plan:  Follow up for neck pain and associated occipital headache, found relief with the use of Mobic and Voltaren gel. X-ray of the spine shows some osteophytes posteriorly and mild arthritic changes. As patient's continues to have elevated blood pressure the pain reigmin was changed to Capsaicin cream BID and prn Tylenol arthritis 650 mg Q 8 hours. In addition patient is advised to use warm compresses and wear a scarf around the neck to keep warm when outdoors. Physical therapy referral was offered but patient's relative states there is already one in place and will like to focus on symptomatic treatment at this time. Subjective      Ms Carole Garcia is a 72 Y. [place] who is Sami speaking history was obtained using [employer] translation services. Patient endorsed improvement is neck pain with the use of Mobic and Voltaren gel. The pain is less frequent now, aggravated with cold exposure. She denies any other joint pain at this time. Her neck x-ray results were reviewed with the patient, all questions were answered. She denies fevers, chills, chest pain or shortness of breath. Review of Systems   Constitutional:  Negative for activity change, appetite change, fatigue and fever. HENT:  Negative for congestion. Respiratory:  Negative for chest tightness and shortness of breath. Cardiovascular:  Negative for chest pain. Gastrointestinal:  Negative for abdominal pain. Musculoskeletal:  Positive for neck pain. Negative for arthralgias and myalgias. Skin:  Negative for color change and rash. Neurological:  Negative for weakness and light-headedness.        Current Outpatient Medications on File Prior to Visit   Medication Sig    Diclofenac Sodium (VOLTAREN) 1 % Apply 2 g topically 4 (four) times a day    meloxicam (Mobic) 7.5 mg tablet Take 1 tablet (7.5 mg total) by mouth daily    [DISCONTINUED] acetaminophen (TYLENOL) 325 mg tablet Take 650 mg by mouth every 6 (six) hours as needed for mild pain       Objective     /88 (BP Location: Left arm, Patient Position: Sitting, Cuff Size: Adult)   Pulse 84   Temp 99.1 °F (37.3 °C) (Tympanic)   Resp 16   Wt 73.9 kg (163 lb)   SpO2 98%   BMI 27.98 kg/m²     Physical Exam  Vitals reviewed. Constitutional:       Appearance: Normal appearance. HENT:      Head: Normocephalic and atraumatic. Mouth/Throat:      Mouth: Mucous membranes are moist.      Pharynx: Oropharynx is clear. Eyes:      General: No scleral icterus. Extraocular Movements: Extraocular movements intact. Conjunctiva/sclera: Conjunctivae normal.   Neck:      Comments: No tenderness to palpation. Cardiovascular:      Rate and Rhythm: Normal rate and regular rhythm. Pulses: Normal pulses. Pulmonary:      Effort: Pulmonary effort is normal. No respiratory distress. Breath sounds: Normal breath sounds. No stridor. No wheezing. Abdominal:      General: Bowel sounds are normal. There is no distension. Palpations: Abdomen is soft. Tenderness: There is no abdominal tenderness. Musculoskeletal:      Cervical back: Normal range of motion and neck supple. No crepitus. No pain with movement, spinous process tenderness or muscular tenderness. Right lower leg: No edema. Left lower leg: No edema. Neurological:      Mental Status: She is alert and oriented to person, place, and time. Mental status is at baseline. Psychiatric:         Mood and Affect: Mood normal.         Behavior: Behavior normal.         Thought Content:  Thought content normal.         Judgment: Judgment normal.       Scooter Serrano MD

## 2024-02-05 DIAGNOSIS — I10 HYPERTENSION, UNSPECIFIED TYPE: ICD-10-CM

## 2024-02-05 RX ORDER — AMLODIPINE BESYLATE 5 MG/1
5 TABLET ORAL DAILY
Qty: 90 TABLET | Refills: 0 | Status: SHIPPED | OUTPATIENT
Start: 2024-02-05

## 2024-04-19 ENCOUNTER — OFFICE VISIT (OUTPATIENT)
Dept: INTERNAL MEDICINE CLINIC | Facility: CLINIC | Age: 74
End: 2024-04-19

## 2024-04-19 VITALS
SYSTOLIC BLOOD PRESSURE: 138 MMHG | DIASTOLIC BLOOD PRESSURE: 80 MMHG | RESPIRATION RATE: 14 BRPM | HEIGHT: 64 IN | TEMPERATURE: 98.9 F | BODY MASS INDEX: 27.49 KG/M2 | WEIGHT: 161 LBS | HEART RATE: 80 BPM | OXYGEN SATURATION: 98 %

## 2024-04-19 DIAGNOSIS — M54.2 NECK PAIN: ICD-10-CM

## 2024-04-19 DIAGNOSIS — M47.812 SPONDYLOSIS OF CERVICAL REGION WITHOUT MYELOPATHY OR RADICULOPATHY: Primary | ICD-10-CM

## 2024-04-19 PROCEDURE — 99213 OFFICE O/P EST LOW 20 MIN: CPT | Performed by: INTERNAL MEDICINE

## 2024-04-19 RX ORDER — LIDOCAINE 50 MG/G
1 PATCH TOPICAL DAILY
Qty: 5 PATCH | Refills: 4 | Status: SHIPPED | OUTPATIENT
Start: 2024-04-19 | End: 2024-04-19

## 2024-04-19 RX ORDER — LIDOCAINE 50 MG/G
1 PATCH TOPICAL DAILY
Qty: 5 PATCH | Refills: 4 | Status: SHIPPED | OUTPATIENT
Start: 2024-04-19

## 2024-04-19 NOTE — ASSESSMENT & PLAN NOTE
No red flag symptoms  Temporary relief with capsaicin  Given lidocaine patches and Voltaren gel.  Instructed not to use lidocaine patches and capsaicin cream simultaneously.  Given exercises for her neck  Referred to physical therapy

## 2024-04-19 NOTE — LETTER
To whom it may concern:    Mendy Lyle will require physical therapy for her neck for a minimum of 6 weeks in the [place].    Thank you,    Bogdan Pickett

## 2024-04-19 NOTE — PROGRESS NOTES
Name: Mendy Lyle      : 1950      MRN: [id number]  Encounter Provider: Bogdan Pickett DO  Encounter Date: 2024   Encounter department: [facility] [place]    Assessment & Plan     1. Spondylosis of cervical region without myelopathy or radiculopathy  Assessment & Plan:  No red flag symptoms  Temporary relief with capsaicin  Given lidocaine patches and Voltaren gel.  Instructed not to use lidocaine patches and capsaicin cream simultaneously.  Given exercises for her neck  Referred to physical therapy    Orders:  -     Ambulatory Referral to Physical Therapy; Future  -     lidocaine (Lidoderm) 5 %; Apply 1 patch topically over 12 hours daily Remove & Discard patch within 12 hours or as directed by MD    2. Neck pain  -     Diclofenac Sodium (VOLTAREN) 1 %; Apply 2 g topically 4 (four) times a day           Subjective      Mendy Lyle presents today for continued neck pain.  "[employer]  services utilized,  Carole RAMESH ([id number]), used throughout visit.  Son also help provide some background history.  Pain has been present for weeks to months.  Located both sides of her neck.  Radiates up into her head causing headaches.  Rated anywhere from 6-8.  She has used some capsaicin cream which does provide temporary relief.  Has not done physical therapy for this yet.  She has had a neck x-ray showing some mild arthritis and spondylosis.  She does not have any numbness or tingling in her hands, she does not have any pain shooting down her arms.      Review of Systems   Musculoskeletal:  Positive for neck pain and neck stiffness.   Neurological:  Negative for weakness and numbness.       Current Outpatient Medications on File Prior to Visit   Medication Sig    acetaminophen (TYLENOL) 650 mg CR tablet Take 1 tablet (650 mg total) by mouth every 8 (eight) hours as needed for mild pain    amLODIPine (NORVASC) 5 mg tablet TAKE 1 TABLET (5 MG TOTAL) BY MOUTH DAILY.     "capsaicin (ZOSTRIX) 0.025 % cream Apply 1 Application topically 2 (two) times a day    [DISCONTINUED] Diclofenac Sodium (VOLTAREN) 1 % Apply 2 g topically 4 (four) times a day    [DISCONTINUED] meloxicam (Mobic) 7.5 mg tablet Take 1 tablet (7.5 mg total) by mouth daily (Patient not taking: Reported on 4/19/2024)       Objective     /80 (BP Location: Left arm, Patient Position: Sitting, Cuff Size: Large)   Pulse 80   Temp 98.9 °F (37.2 °C) (Tympanic)   Resp 14   Ht 5' 4\" (1.626 m)   Wt 73 kg (161 lb)   SpO2 98%   BMI 27.64 kg/m²     Physical Exam  Constitutional:       General: She is not in acute distress.     Appearance: She is normal weight. She is not ill-appearing.   HENT:      Mouth/Throat:      Mouth: Mucous membranes are moist.      Pharynx: Oropharynx is clear.   Cardiovascular:      Rate and Rhythm: Normal rate and regular rhythm.      Heart sounds: No murmur heard.  Pulmonary:      Effort: Pulmonary effort is normal.      Breath sounds: Normal breath sounds.   Musculoskeletal:      Comments: Hypertonic trapezius muscles  Neck extension somewhat limited, bilateral rotation and flexion preserved   Skin:     General: Skin is warm.   Neurological:      Mental Status: She is alert. Mental status is at baseline.   Psychiatric:         Mood and Affect: Mood normal.       Bogdan Pickett, DO    "